# Patient Record
Sex: MALE | Race: WHITE | NOT HISPANIC OR LATINO | Employment: FULL TIME | URBAN - METROPOLITAN AREA
[De-identification: names, ages, dates, MRNs, and addresses within clinical notes are randomized per-mention and may not be internally consistent; named-entity substitution may affect disease eponyms.]

---

## 2023-02-12 ENCOUNTER — APPOINTMENT (EMERGENCY)
Dept: RADIOLOGY | Facility: HOSPITAL | Age: 48
End: 2023-02-12

## 2023-02-12 ENCOUNTER — HOSPITAL ENCOUNTER (EMERGENCY)
Facility: HOSPITAL | Age: 48
Discharge: HOME | End: 2023-02-12
Attending: EMERGENCY MEDICINE

## 2023-02-12 VITALS
TEMPERATURE: 97.9 F | HEART RATE: 97 BPM | RESPIRATION RATE: 18 BRPM | SYSTOLIC BLOOD PRESSURE: 156 MMHG | OXYGEN SATURATION: 98 % | DIASTOLIC BLOOD PRESSURE: 97 MMHG

## 2023-02-12 DIAGNOSIS — S42.401A ELBOW FRACTURE, RIGHT: ICD-10-CM

## 2023-02-12 DIAGNOSIS — S53.442A ULNAR COLLATERAL LIGAMENT SPRAIN OF LEFT ELBOW, INITIAL ENCOUNTER: Primary | ICD-10-CM

## 2023-02-12 NOTE — ED PROVIDER NOTES
History  Chief Complaint   Patient presents with   • Elbow Pain     Pt to ED c/o R elbow pain  Pt states he lifted a pile of jeans last night and heard a pop  Pt states he has hyperextended the elbow before but has had no significant injury to the arm  Pt states he cannot straighen elbow, able to move shoulder and wrist  Denies numbness in fingers  64-year-old right-handed male presents for the evaluation of right elbow pain which began yesterday after he was trying to lift up a pile of jeans that was above his head while shopping  The patient states that he had immediate pain in the medial aspect of his elbow  He states this pain is worse if he tries to extend his arm past 90 degrees  He states the pain is improved if it is in a sling or flexed at 90 degrees or more  The patient denies any associated shoulder or wrist pain  He denies any weakness in the hand or numbness  Elbow Pain      None       History reviewed  No pertinent past medical history  History reviewed  No pertinent surgical history  History reviewed  No pertinent family history  I have reviewed and agree with the history as documented  E-Cigarette/Vaping   • E-Cigarette Use Never User      E-Cigarette/Vaping Substances     Social History     Tobacco Use   • Smoking status: Never   • Smokeless tobacco: Never   Vaping Use   • Vaping Use: Never used   Substance Use Topics   • Alcohol use: Never   • Drug use: Never       Review of Systems    Physical Exam  Physical Exam  Vitals and nursing note reviewed  Musculoskeletal:      Right elbow: No swelling, deformity or effusion  Decreased range of motion ( Cannot fully extend arm past 90 degrees without moderate pain)  Right wrist: Normal pulse  Skin:     General: Skin is warm  Capillary Refill: Capillary refill takes less than 2 seconds           Vital Signs  ED Triage Vitals [02/12/23 1041]   Temperature Pulse Respirations Blood Pressure SpO2   97 9 °F (36 6 °C) 97 18 156/97 98 %      Temp src Heart Rate Source Patient Position - Orthostatic VS BP Location FiO2 (%)   -- Monitor Sitting Left arm --      Pain Score       5           Vitals:    02/12/23 1041   BP: 156/97   Pulse: 97   Patient Position - Orthostatic VS: Sitting         Visual Acuity      ED Medications  Medications - No data to display    Diagnostic Studies  Results Reviewed     None                 XR elbow 3+ views RIGHT   ED Interpretation by Sofía Bajwa DO (02/12 1126)   Questionable small avulsion fracture of proximal ulna                 Procedures  Procedures         ED Course                                             Medical Decision Making  Differential diagnosis: Ligamentous injury, strain, dislocation, avulsion fracture  Is to obtain an x-ray to rule out fracture/dislocation versus likely diagnosis of ulnar collateral ligament injury    I discussed my interpretation of the x-ray with the patient and showed him the films  We discussed the plan for the patient to use his previously obtained sling and follow-up with orthopedics as an outpatient  NSAIDs for pain control    The patient (and any family present) verbalized understanding of the discharge instructions and warnings that would necessitate return to the Emergency Department  All questions were answered prior to discharge  Elbow fracture, right: acute illness or injury  Ulnar collateral ligament sprain of left elbow, initial encounter: acute illness or injury  Amount and/or Complexity of Data Reviewed  Radiology: ordered and independent interpretation performed            Disposition  Final diagnoses:   Ulnar collateral ligament sprain of left elbow, initial encounter   Elbow fracture, right     Time reflects when diagnosis was documented in both MDM as applicable and the Disposition within this note     Time User Action Codes Description Comment    2/12/2023 11:26 AM Sabine Nelson Add [[id number]] Ulnar collateral ligament sprain of left elbow, initial encounter     2/12/2023 11:26 AM Aide Montanez Add [[id number]] Elbow fracture, right       ED Disposition     ED Disposition   Discharge    Condition   Stable    Date/Time   Sun Feb 12, 2023 11:26 AM    Comment   Nicki Davis discharge to home/self care  Follow-up Information    None         There are no discharge medications for this patient  No discharge procedures on file      PDMP Review     None          ED Provider  Electronically Signed by           Cristobal Rodriguez DO  02/12/23 0688